# Patient Record
Sex: MALE | Race: ASIAN
[De-identification: names, ages, dates, MRNs, and addresses within clinical notes are randomized per-mention and may not be internally consistent; named-entity substitution may affect disease eponyms.]

---

## 2022-10-28 PROBLEM — Z00.00 ENCOUNTER FOR PREVENTIVE HEALTH EXAMINATION: Status: ACTIVE | Noted: 2022-10-28

## 2022-11-03 ENCOUNTER — APPOINTMENT (OUTPATIENT)
Dept: NEUROLOGY | Facility: CLINIC | Age: 42
End: 2022-11-03

## 2022-11-03 VITALS
BODY MASS INDEX: 23.34 KG/M2 | SYSTOLIC BLOOD PRESSURE: 115 MMHG | HEIGHT: 70 IN | WEIGHT: 163.03 LBS | TEMPERATURE: 97.3 F | DIASTOLIC BLOOD PRESSURE: 72 MMHG | HEART RATE: 72 BPM | OXYGEN SATURATION: 97 %

## 2022-11-03 DIAGNOSIS — G57.31 LESION OF LATERAL POPLITEAL NERVE, RIGHT LOWER LIMB: ICD-10-CM

## 2022-11-03 PROCEDURE — 95909 NRV CNDJ TST 5-6 STUDIES: CPT

## 2022-11-03 PROCEDURE — 99203 OFFICE O/P NEW LOW 30 MIN: CPT | Mod: 25

## 2022-11-03 NOTE — PHYSICAL EXAM
[FreeTextEntry1] : Motor: 5/5 strength throughout, normal bulk and tone\par Sensory: slightly diminished light touch on right anterolateral lower leg compared  to left; pinprick intact and symmetric, no Tinel's sign at fibular head b/l \par Reflexes: 2+ symmetric throughout\par Gait: normal

## 2022-11-03 NOTE — ASSESSMENT
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

## 2022-11-03 NOTE — HISTORY OF PRESENT ILLNESS
[FreeTextEntry1] : Here for evaluation of right leg numbness - started about 5 weeks ago, resolved in the past 1-2 weeks\par Numbness was in the anterior right lower leg below the knee, no clear foot involvement, no weakness \par He denies back pain or radiating pain down the leg \par \par No PMH\par Surgery for testicular torsion around 12-13 years old \par No Medications

## 2022-11-03 NOTE — PROCEDURE
[FreeTextEntry1] : \par Nerve Conduction and Electromyography Report\par \par  [FreeTextEntry3] : \par Electro Physiologic Findings:\par \par Limb temperature was monitored and maintained at approximately 30 – 34° C in the lower extremities.\par \par The superficial fibular sensory responses were normal bilaterally and symmetric. Evaluation of the right fibular motor response at the EDB suggested mild (25%) conduction block across the fibular head without definite slowing. The left fibular motor response at the EDB and the right fibular motor response at the tibialis anterior were normal. The right fibular F-waves were prolonged and dispersed, while the left fibular F-waves were normal. \par \par Clinical Electrophysiological Impression: \par \par This nerve conduction study of the lower extremities suggested a mild right common fibular (peroneal) nerve entrapment at the fibular head. There was no evidence of polyneuropathy on this study.